# Patient Record
(demographics unavailable — no encounter records)

---

## 2024-11-14 NOTE — HISTORY OF PRESENT ILLNESS
[FreeTextEntry1] : lipid , thyroid [de-identified] : reviewed 11/6/24 , nl thyroid labs  cholesterol 245, , hgb 9.9, nl fe, ferritin.  diet- reviewed healthy diet.  pt to decr portion size of carbs lipid- eating lamb, beef- 2-3 / mo . cut down on using coffee creamer. eating chocolates, cookies.  - pt refused statin anemia- hx of thalassemia hypothyroid- compliant w med.   R heel pain- 2-3 mo-  ref to Podiatry rec mammo- pt will make appt

## 2024-11-14 NOTE — COUNSELING
[Potential consequences of obesity discussed] : Potential consequences of obesity discussed [Benefits of weight loss discussed] : Benefits of weight loss discussed [Encouraged to maintain food diary] : Encouraged to maintain food diary [Encouraged to increase physical activity] : Encouraged to increase physical activity [Decrease Portions] : decrease portions [____ min/wk Activity] : [unfilled] min/wk activity [Good understanding] : Patient has a good understanding of disease, goals and obesity follow-up plan [FreeTextEntry4] : 15

## 2024-11-14 NOTE — ASSESSMENT
[FreeTextEntry1] : obesity- pt instructed on SE of zepbound and how to inject medication flu vac given